# Patient Record
Sex: FEMALE | Race: BLACK OR AFRICAN AMERICAN | NOT HISPANIC OR LATINO | URBAN - METROPOLITAN AREA
[De-identification: names, ages, dates, MRNs, and addresses within clinical notes are randomized per-mention and may not be internally consistent; named-entity substitution may affect disease eponyms.]

---

## 2023-09-15 ENCOUNTER — APPOINTMENT (RX ONLY)
Dept: URBAN - METROPOLITAN AREA CLINIC 33 | Facility: CLINIC | Age: 38
Setting detail: DERMATOLOGY
End: 2023-09-15

## 2023-09-15 DIAGNOSIS — L21.8 OTHER SEBORRHEIC DERMATITIS: ICD-10-CM | Status: INADEQUATELY CONTROLLED

## 2023-09-15 PROCEDURE — 99204 OFFICE O/P NEW MOD 45 MIN: CPT

## 2023-09-15 PROCEDURE — ? PRESCRIPTION MEDICATION MANAGEMENT

## 2023-09-15 PROCEDURE — ? PRESCRIPTION

## 2023-09-15 PROCEDURE — ? COUNSELING

## 2023-09-15 RX ORDER — MOMETASONE FUROATE 1 MG/G
CREAM TOPICAL
Qty: 45 | Refills: 1 | Status: ERX | COMMUNITY
Start: 2023-09-15

## 2023-09-15 RX ORDER — FLUOCINOLONE ACETONIDE 0.11 MG/ML
OIL TOPICAL
Qty: 118.28 | Refills: 0 | Status: ERX | COMMUNITY
Start: 2023-09-15

## 2023-09-15 RX ORDER — KETOCONAZOLE 20 MG/ML
SHAMPOO, SUSPENSION TOPICAL
Qty: 120 | Refills: 3 | Status: ERX | COMMUNITY
Start: 2023-09-15

## 2023-09-15 RX ADMIN — MOMETASONE FUROATE: 1 CREAM TOPICAL at 00:00

## 2023-09-15 RX ADMIN — FLUOCINOLONE ACETONIDE: 0.11 OIL TOPICAL at 00:00

## 2023-09-15 RX ADMIN — KETOCONAZOLE: 20 SHAMPOO, SUSPENSION TOPICAL at 00:00

## 2023-09-15 ASSESSMENT — LOCATION ZONE DERM: LOCATION ZONE: FACE

## 2023-09-15 ASSESSMENT — LOCATION SIMPLE DESCRIPTION DERM: LOCATION SIMPLE: RIGHT FOREHEAD

## 2023-09-15 ASSESSMENT — LOCATION DETAILED DESCRIPTION DERM: LOCATION DETAILED: RIGHT SUPERIOR MEDIAL FOREHEAD

## 2023-09-15 NOTE — PROCEDURE: PRESCRIPTION MEDICATION MANAGEMENT
Render In Strict Bullet Format?: No
Plan: ketoconazole 2 % shampoo: Wash scalp (not hair) once every week Let sit for 2-3 mins before rinsing out.\\n\\nfluocinolone 0.01 % topical body oil: Apply twice daily for 2 weeks when flaring.\\n\\nmometasone 0.1 % topical cream: Apply to flares on the face QAM
Detail Level: Zone

## 2024-01-31 ENCOUNTER — APPOINTMENT (RX ONLY)
Dept: URBAN - METROPOLITAN AREA CLINIC 33 | Facility: CLINIC | Age: 39
Setting detail: DERMATOLOGY
End: 2024-01-31

## 2024-01-31 DIAGNOSIS — L21.8 OTHER SEBORRHEIC DERMATITIS: ICD-10-CM | Status: INADEQUATELY CONTROLLED

## 2024-01-31 PROCEDURE — ? PRESCRIPTION

## 2024-01-31 PROCEDURE — ? COUNSELING

## 2024-01-31 PROCEDURE — 99214 OFFICE O/P EST MOD 30 MIN: CPT

## 2024-01-31 PROCEDURE — ? PRESCRIPTION MEDICATION MANAGEMENT

## 2024-01-31 RX ORDER — TRIAMCINOLONE ACETONIDE 1 MG/G
OINTMENT TOPICAL
Qty: 80 | Refills: 3 | Status: ERX | COMMUNITY
Start: 2024-01-31

## 2024-01-31 RX ORDER — TACROLIMUS 1 MG/G
OINTMENT TOPICAL
Qty: 60 | Refills: 3 | Status: ERX | COMMUNITY
Start: 2024-01-31

## 2024-01-31 RX ADMIN — TRIAMCINOLONE ACETONIDE: 1 OINTMENT TOPICAL at 00:00

## 2024-01-31 RX ADMIN — TACROLIMUS: 1 OINTMENT TOPICAL at 00:00

## 2024-01-31 ASSESSMENT — LOCATION SIMPLE DESCRIPTION DERM: LOCATION SIMPLE: RIGHT FOREHEAD

## 2024-01-31 ASSESSMENT — LOCATION ZONE DERM: LOCATION ZONE: FACE

## 2024-01-31 ASSESSMENT — LOCATION DETAILED DESCRIPTION DERM: LOCATION DETAILED: RIGHT SUPERIOR MEDIAL FOREHEAD

## 2024-01-31 NOTE — PROCEDURE: COUNSELING
Detail Level: Detailed
Patient Specific Counseling (Will Not Stick From Patient To Patient): …\\nJW encourages pt to stick to shampooing twice a week instead of weekly.

## 2024-01-31 NOTE — PROCEDURE: PRESCRIPTION MEDICATION MANAGEMENT
Render In Strict Bullet Format?: No
Modify Regimen: Shampoo hair every 2 weeks
Discontinue Regimen: Ketoconazole 2 % shampoo: Wash scalp (not hair) once every week Let sit for 2-3 mins before rinsing out.\\nFluocinolone 0.01 % topical body oil: Apply twice daily for 2 weeks when flaring.\\nMometasone 0.1 % topical cream: Apply to flares on the face QAM
Initiate Treatment: Triamcinolone 0.1% ointment 2 nights/week to scalp\\nTacrolimus 0.1% ointment bid to face
Detail Level: Zone